# Patient Record
Sex: FEMALE | Race: WHITE | NOT HISPANIC OR LATINO | Employment: FULL TIME | URBAN - METROPOLITAN AREA
[De-identification: names, ages, dates, MRNs, and addresses within clinical notes are randomized per-mention and may not be internally consistent; named-entity substitution may affect disease eponyms.]

---

## 2023-04-13 PROBLEM — M54.16 LUMBAR RADICULOPATHY, RIGHT: Status: ACTIVE | Noted: 2023-04-13

## 2023-04-13 PROBLEM — D22.9 SKIN MOLE: Status: ACTIVE | Noted: 2023-04-13

## 2023-04-13 PROBLEM — D68.61 ANTIPHOSPHOLIPID SYNDROME (MULTI): Status: ACTIVE | Noted: 2023-04-13

## 2023-04-13 PROBLEM — R10.2 FEMALE PELVIC PAIN: Status: ACTIVE | Noted: 2023-04-13

## 2023-04-13 PROBLEM — B00.1 HERPES LABIALIS: Status: ACTIVE | Noted: 2023-04-13

## 2023-04-13 PROBLEM — M99.09 SEGMENTAL AND SOMATIC DYSFUNCTION: Status: ACTIVE | Noted: 2023-04-13

## 2023-04-13 PROBLEM — R19.8 CHANGE IN BOWEL FUNCTION: Status: ACTIVE | Noted: 2023-04-13

## 2023-04-13 PROBLEM — M99.03 LUMBOSACRAL DYSFUNCTION: Status: ACTIVE | Noted: 2023-04-13

## 2023-04-13 RX ORDER — ACETAMINOPHEN 325 MG/1
TABLET ORAL EVERY 6 HOURS
COMMUNITY
Start: 2019-07-12 | End: 2023-04-21 | Stop reason: ALTCHOICE

## 2023-04-13 RX ORDER — MULTIVITAMIN
TABLET ORAL
COMMUNITY
End: 2023-04-21 | Stop reason: ALTCHOICE

## 2023-04-13 RX ORDER — DOCUSATE SODIUM 50 MG/5ML
LIQUID ORAL
COMMUNITY
End: 2023-04-21 | Stop reason: ALTCHOICE

## 2023-04-13 RX ORDER — OXYCODONE HYDROCHLORIDE 10 MG/1
TABLET ORAL EVERY 6 HOURS PRN
COMMUNITY
Start: 2019-07-12 | End: 2023-04-21 | Stop reason: ALTCHOICE

## 2023-04-13 RX ORDER — FERROUS SULFATE 325(65) MG
TABLET ORAL
COMMUNITY
Start: 2019-07-12 | End: 2023-04-21 | Stop reason: ALTCHOICE

## 2023-04-13 RX ORDER — VALACYCLOVIR HYDROCHLORIDE 1 G/1
TABLET, FILM COATED ORAL
COMMUNITY
Start: 2018-07-25

## 2023-04-13 RX ORDER — IBUPROFEN 600 MG/1
TABLET ORAL AS NEEDED
COMMUNITY
Start: 2019-07-12

## 2023-04-21 ENCOUNTER — OFFICE VISIT (OUTPATIENT)
Dept: PRIMARY CARE | Facility: CLINIC | Age: 48
End: 2023-04-21
Payer: COMMERCIAL

## 2023-04-21 VITALS
SYSTOLIC BLOOD PRESSURE: 132 MMHG | HEART RATE: 66 BPM | TEMPERATURE: 96.8 F | BODY MASS INDEX: 22.16 KG/M2 | HEIGHT: 65 IN | WEIGHT: 133 LBS | RESPIRATION RATE: 20 BRPM | DIASTOLIC BLOOD PRESSURE: 86 MMHG

## 2023-04-21 DIAGNOSIS — Z13.29 SCREENING FOR THYROID DISORDER: ICD-10-CM

## 2023-04-21 DIAGNOSIS — Z13.220 SCREENING FOR LIPID DISORDERS: ICD-10-CM

## 2023-04-21 DIAGNOSIS — M99.03 LUMBOSACRAL DYSFUNCTION: ICD-10-CM

## 2023-04-21 DIAGNOSIS — Z00.00 ROUTINE GENERAL MEDICAL EXAMINATION AT A HEALTH CARE FACILITY: Primary | ICD-10-CM

## 2023-04-21 DIAGNOSIS — B00.1 HERPES LABIALIS: ICD-10-CM

## 2023-04-21 DIAGNOSIS — E89.41 SYMPTOMATIC POSTSURGICAL MENOPAUSE: ICD-10-CM

## 2023-04-21 PROBLEM — M99.09 SEGMENTAL AND SOMATIC DYSFUNCTION: Status: RESOLVED | Noted: 2023-04-13 | Resolved: 2023-04-21

## 2023-04-21 PROBLEM — R19.8 CHANGE IN BOWEL FUNCTION: Status: RESOLVED | Noted: 2023-04-13 | Resolved: 2023-04-21

## 2023-04-21 PROBLEM — D68.61 ANTIPHOSPHOLIPID SYNDROME (MULTI): Status: RESOLVED | Noted: 2023-04-13 | Resolved: 2023-04-21

## 2023-04-21 PROBLEM — D22.9 SKIN MOLE: Status: RESOLVED | Noted: 2023-04-13 | Resolved: 2023-04-21

## 2023-04-21 LAB
APPEARANCE, URINE: CLEAR
BILIRUBIN, URINE: NEGATIVE
BLOOD, URINE: NEGATIVE
COLOR, URINE: COLORLESS
GLUCOSE, URINE: NEGATIVE MG/DL
KETONES, URINE: NEGATIVE MG/DL
LEUKOCYTE ESTERASE, URINE: NEGATIVE
NITRITE, URINE: NEGATIVE
PH, URINE: 7 (ref 5–8)
PROTEIN, URINE: NEGATIVE MG/DL
SPECIFIC GRAVITY, URINE: 1 (ref 1–1.03)
UROBILINOGEN, URINE: <2 MG/DL (ref 0–1.9)

## 2023-04-21 PROCEDURE — 1036F TOBACCO NON-USER: CPT | Performed by: INTERNAL MEDICINE

## 2023-04-21 PROCEDURE — 81003 URINALYSIS AUTO W/O SCOPE: CPT

## 2023-04-21 PROCEDURE — 99396 PREV VISIT EST AGE 40-64: CPT | Performed by: INTERNAL MEDICINE

## 2023-04-21 ASSESSMENT — ENCOUNTER SYMPTOMS
SINUS PAIN: 0
HEMATURIA: 0
ARTHRALGIAS: 0
WOUND: 0
RECTAL PAIN: 0
VOICE CHANGE: 0
NECK STIFFNESS: 0
DYSURIA: 0
EYE ITCHING: 0
FEVER: 0
ACTIVITY CHANGE: 0
VOMITING: 0
SPEECH DIFFICULTY: 0
NERVOUS/ANXIOUS: 0
POLYDIPSIA: 0
CONFUSION: 0
MYALGIAS: 0
NUMBNESS: 0
LIGHT-HEADEDNESS: 0
DIAPHORESIS: 0
FATIGUE: 0
STRIDOR: 0
COLOR CHANGE: 0
BRUISES/BLEEDS EASILY: 0
PALPITATIONS: 0
ANAL BLEEDING: 0
SORE THROAT: 0
CHEST TIGHTNESS: 0
BLOOD IN STOOL: 0
ADENOPATHY: 0
WEAKNESS: 0
ABDOMINAL DISTENTION: 0
JOINT SWELLING: 0
EYE PAIN: 0
EYE REDNESS: 0
APPETITE CHANGE: 0
SHORTNESS OF BREATH: 0
PHOTOPHOBIA: 0
APNEA: 0
BACK PAIN: 1
CONSTIPATION: 0
NECK PAIN: 0
FREQUENCY: 0
CHILLS: 0
DIARRHEA: 0
AGITATION: 0
WHEEZING: 0
HEADACHES: 0
EYE DISCHARGE: 0
SEIZURES: 0
FACIAL ASYMMETRY: 0
ABDOMINAL PAIN: 0
NAUSEA: 0
COUGH: 0
POLYPHAGIA: 0
DIZZINESS: 0
DECREASED CONCENTRATION: 0
TREMORS: 0
HALLUCINATIONS: 0
FLANK PAIN: 0
UNEXPECTED WEIGHT CHANGE: 0
DIFFICULTY URINATING: 0
HYPERACTIVE: 0
FACIAL SWELLING: 0
SINUS PRESSURE: 0
CHOKING: 0
SLEEP DISTURBANCE: 0
DYSPHORIC MOOD: 0
TROUBLE SWALLOWING: 0
RHINORRHEA: 0

## 2023-04-21 NOTE — PROGRESS NOTES
Subjective   Patient ID: Manjula Prabhakar is a 47 y.o. female who presents for Annual Exam.    HPI    Pt here for full physical.  She is exercising regularly and eating well.  Weight is stable.      She just saw GYN-Dr. Schmidt this past week.  She did have a pap of the vaginal vault.  She has had partial hysterectomy.  She had her mammogram in June of 2022 that was negative.  She has her order.  She has no breast issues.    She did cologuard in 2022 and it was negative.  She has occasional constipation and will use Colace PRN.  She has history of hemorrhoids.  She saw specialist and was dx with fissure.  She was given nitroglycerin cream to use to area.  She also was given Paleo fiber which has helped.      She was having some panic attacks in the fall of 2022.  She started seeing a counselor since then and that has really helped.  She feels better at this time.       She has been told in past she had antiphospholipid ab however labs from 13 yo only revealed cardiolipin levels in equivocal range and not fulminately positive.  No history of blood clots/PE. Did have miscarriage.      Review of Systems   Constitutional:  Negative for activity change, appetite change, chills, diaphoresis, fatigue, fever and unexpected weight change.   HENT:  Negative for congestion, dental problem, drooling, ear discharge, ear pain, facial swelling, hearing loss, mouth sores, nosebleeds, postnasal drip, rhinorrhea, sinus pressure, sinus pain, sneezing, sore throat, tinnitus, trouble swallowing and voice change.    Eyes:  Negative for photophobia, pain, discharge, redness, itching and visual disturbance (wears glasses-has appointment next week).   Respiratory:  Negative for apnea, cough, choking, chest tightness, shortness of breath, wheezing and stridor.    Cardiovascular:  Negative for chest pain, palpitations and leg swelling.   Gastrointestinal:  Negative for abdominal distention, abdominal pain, anal bleeding, blood in stool,  "constipation, diarrhea, nausea, rectal pain and vomiting.   Endocrine: Positive for heat intolerance (hot flashes). Negative for cold intolerance, polydipsia, polyphagia and polyuria.   Genitourinary:  Negative for decreased urine volume, difficulty urinating, dyspareunia, dysuria, enuresis, flank pain, frequency, genital sores, hematuria, menstrual problem, pelvic pain, urgency, vaginal bleeding, vaginal discharge and vaginal pain.   Musculoskeletal:  Positive for back pain. Negative for arthralgias, gait problem, joint swelling, myalgias, neck pain and neck stiffness.   Skin:  Negative for color change, pallor, rash and wound.   Allergic/Immunologic: Negative for environmental allergies and food allergies.   Neurological:  Negative for dizziness, tremors, seizures, syncope, facial asymmetry, speech difficulty, weakness, light-headedness, numbness and headaches.   Hematological:  Negative for adenopathy. Does not bruise/bleed easily.   Psychiatric/Behavioral:  Negative for agitation, behavioral problems, confusion, decreased concentration, dysphoric mood, hallucinations, self-injury, sleep disturbance and suicidal ideas. The patient is not nervous/anxious and is not hyperactive.        Objective   /86   Pulse 66   Temp 36 °C (96.8 °F)   Resp 20   Ht 1.638 m (5' 4.5\")   Wt 60.3 kg (133 lb)   BMI 22.48 kg/m²    Physical Exam  Constitutional:       Appearance: Normal appearance.   HENT:      Head: Normocephalic and atraumatic.      Right Ear: Tympanic membrane, ear canal and external ear normal. There is no impacted cerumen.      Left Ear: Tympanic membrane, ear canal and external ear normal. There is no impacted cerumen.      Nose: Nose normal. No congestion or rhinorrhea.      Mouth/Throat:      Mouth: Mucous membranes are moist.      Pharynx: Oropharynx is clear. No oropharyngeal exudate or posterior oropharyngeal erythema.   Eyes:      Extraocular Movements: Extraocular movements intact.      " Conjunctiva/sclera: Conjunctivae normal.      Pupils: Pupils are equal, round, and reactive to light.   Neck:      Vascular: No carotid bruit.   Cardiovascular:      Rate and Rhythm: Normal rate and regular rhythm.      Pulses: Normal pulses.      Heart sounds: Normal heart sounds. No murmur heard.  Pulmonary:      Effort: Pulmonary effort is normal. No respiratory distress.      Breath sounds: Normal breath sounds. No wheezing, rhonchi or rales.   Abdominal:      General: Abdomen is flat. Bowel sounds are normal. There is no distension.      Palpations: Abdomen is soft.      Tenderness: There is no abdominal tenderness.      Hernia: No hernia is present.   Musculoskeletal:         General: No swelling or tenderness. Normal range of motion.      Cervical back: Normal range of motion and neck supple.      Right lower leg: No edema.      Left lower leg: No edema.   Lymphadenopathy:      Cervical: No cervical adenopathy.   Skin:     General: Skin is warm and dry.      Findings: No lesion or rash.   Neurological:      General: No focal deficit present.      Mental Status: She is alert and oriented to person, place, and time.      Cranial Nerves: No cranial nerve deficit.      Sensory: No sensory deficit.      Motor: No weakness.   Psychiatric:         Mood and Affect: Mood normal.         Behavior: Behavior normal.         Thought Content: Thought content normal.         Judgment: Judgment normal.         Assessment/Plan   Problem List Items Addressed This Visit          Musculoskeletal    Lumbosacral dysfunction     Seeing chiropractor for low back issues  Currently improved without radiation at this time             Infectious/Inflammatory    Herpes labialis     No current issues  Uses Valtrex at sign of outbreak             Other    Symptomatic postsurgical menopause     She would like to try to manage these without hormone therapy  Did provide her with name of [employer] company that does plant based treatment for  symptoms           Other Visit Diagnoses       Routine general medical examination at a health care facility    -  Primary    Relevant Orders    CBC    Comprehensive Metabolic Panel    Urinalysis with Reflex Microscopic    Follow Up In Primary Care    Screening for lipid disorders        Relevant Orders    Lipid Panel    Screening for thyroid disorder        Relevant Orders    Thyroid Stimulating Hormone           No

## 2023-04-21 NOTE — PATIENT INSTRUCTIONS
Look up [employer] for herbal/plant based remedies for hot flashes/menopause   Get fasting blood work when able (no food after midnight)-can have water/black coffee in AM  Continue to work on healthy diet and exercise  See GYN yearly and have mammogram done when due (after 6/6/2023)-order is in   Consider flu and covid boosters in fall  Follow up in 1 year-sooner if needed

## 2023-04-21 NOTE — ASSESSMENT & PLAN NOTE
She would like to try to manage these without hormone therapy  Did provide her with name of [employer] company that does plant based treatment for symptoms

## 2023-04-21 NOTE — ASSESSMENT & PLAN NOTE
Currently not an issue; unclear on diagnosis as this was tested when pregnant and had miscarriage

## 2023-05-05 ENCOUNTER — LAB (OUTPATIENT)
Dept: LAB | Facility: LAB | Age: 48
End: 2023-05-05
Payer: COMMERCIAL

## 2023-05-05 DIAGNOSIS — Z00.00 ROUTINE GENERAL MEDICAL EXAMINATION AT A HEALTH CARE FACILITY: ICD-10-CM

## 2023-05-05 DIAGNOSIS — Z13.29 SCREENING FOR THYROID DISORDER: ICD-10-CM

## 2023-05-05 DIAGNOSIS — Z13.220 SCREENING FOR LIPID DISORDERS: ICD-10-CM

## 2023-05-05 LAB
ALANINE AMINOTRANSFERASE (SGPT) (U/L) IN SER/PLAS: 16 U/L (ref 7–45)
ALBUMIN (G/DL) IN SER/PLAS: 4.4 G/DL (ref 3.4–5)
ALKALINE PHOSPHATASE (U/L) IN SER/PLAS: 61 U/L (ref 33–110)
ANION GAP IN SER/PLAS: 11 MMOL/L (ref 10–20)
ASPARTATE AMINOTRANSFERASE (SGOT) (U/L) IN SER/PLAS: 16 U/L (ref 9–39)
BILIRUBIN TOTAL (MG/DL) IN SER/PLAS: 1.1 MG/DL (ref 0–1.2)
CALCIUM (MG/DL) IN SER/PLAS: 9.9 MG/DL (ref 8.6–10.6)
CARBON DIOXIDE, TOTAL (MMOL/L) IN SER/PLAS: 30 MMOL/L (ref 21–32)
CHLORIDE (MMOL/L) IN SER/PLAS: 104 MMOL/L (ref 98–107)
CHOLESTEROL (MG/DL) IN SER/PLAS: 161 MG/DL (ref 0–199)
CHOLESTEROL IN HDL (MG/DL) IN SER/PLAS: 72.7 MG/DL
CHOLESTEROL/HDL RATIO: 2.2
CREATININE (MG/DL) IN SER/PLAS: 0.79 MG/DL (ref 0.5–1.05)
ERYTHROCYTE DISTRIBUTION WIDTH (RATIO) BY AUTOMATED COUNT: 12.3 % (ref 11.5–14.5)
ERYTHROCYTE MEAN CORPUSCULAR HEMOGLOBIN CONCENTRATION (G/DL) BY AUTOMATED: 34.5 G/DL (ref 32–36)
ERYTHROCYTE MEAN CORPUSCULAR VOLUME (FL) BY AUTOMATED COUNT: 87 FL (ref 80–100)
ERYTHROCYTES (10*6/UL) IN BLOOD BY AUTOMATED COUNT: 4.38 X10E12/L (ref 4–5.2)
GFR FEMALE: >90 ML/MIN/1.73M2
GLUCOSE (MG/DL) IN SER/PLAS: 82 MG/DL (ref 74–99)
HEMATOCRIT (%) IN BLOOD BY AUTOMATED COUNT: 38 % (ref 36–46)
HEMOGLOBIN (G/DL) IN BLOOD: 13.1 G/DL (ref 12–16)
LDL: 80 MG/DL (ref 0–99)
LEUKOCYTES (10*3/UL) IN BLOOD BY AUTOMATED COUNT: 4 X10E9/L (ref 4.4–11.3)
NRBC (PER 100 WBCS) BY AUTOMATED COUNT: 0 /100 WBC (ref 0–0)
PLATELETS (10*3/UL) IN BLOOD AUTOMATED COUNT: 249 X10E9/L (ref 150–450)
POTASSIUM (MMOL/L) IN SER/PLAS: 4.1 MMOL/L (ref 3.5–5.3)
PROTEIN TOTAL: 7.1 G/DL (ref 6.4–8.2)
SODIUM (MMOL/L) IN SER/PLAS: 141 MMOL/L (ref 136–145)
THYROTROPIN (MIU/L) IN SER/PLAS BY DETECTION LIMIT <= 0.05 MIU/L: 1.81 MIU/L (ref 0.44–3.98)
TRIGLYCERIDE (MG/DL) IN SER/PLAS: 40 MG/DL (ref 0–149)
UREA NITROGEN (MG/DL) IN SER/PLAS: 15 MG/DL (ref 6–23)
VLDL: 8 MG/DL (ref 0–40)

## 2023-05-05 PROCEDURE — 80061 LIPID PANEL: CPT

## 2023-05-05 PROCEDURE — 85027 COMPLETE CBC AUTOMATED: CPT

## 2023-05-05 PROCEDURE — 36415 COLL VENOUS BLD VENIPUNCTURE: CPT

## 2023-05-05 PROCEDURE — 80053 COMPREHEN METABOLIC PANEL: CPT

## 2023-05-05 PROCEDURE — 84443 ASSAY THYROID STIM HORMONE: CPT

## 2024-04-24 ENCOUNTER — OFFICE VISIT (OUTPATIENT)
Dept: OBSTETRICS AND GYNECOLOGY | Facility: CLINIC | Age: 49
End: 2024-04-24
Payer: COMMERCIAL

## 2024-04-24 VITALS
HEIGHT: 64 IN | SYSTOLIC BLOOD PRESSURE: 122 MMHG | WEIGHT: 135 LBS | BODY MASS INDEX: 23.05 KG/M2 | DIASTOLIC BLOOD PRESSURE: 80 MMHG

## 2024-04-24 DIAGNOSIS — Z01.419 ENCOUNTER FOR GYNECOLOGICAL EXAMINATION WITHOUT ABNORMAL FINDING: Primary | ICD-10-CM

## 2024-04-24 PROCEDURE — 99396 PREV VISIT EST AGE 40-64: CPT | Performed by: OBSTETRICS & GYNECOLOGY

## 2024-04-24 PROCEDURE — 88141 CYTOPATH C/V INTERPRET: CPT | Performed by: PATHOLOGY

## 2024-04-24 PROCEDURE — 87624 HPV HI-RISK TYP POOLED RSLT: CPT

## 2024-04-24 PROCEDURE — 1036F TOBACCO NON-USER: CPT | Performed by: OBSTETRICS & GYNECOLOGY

## 2024-04-24 PROCEDURE — 88175 CYTOPATH C/V AUTO FLUID REDO: CPT

## 2024-04-24 NOTE — PROGRESS NOTES
Subjective   Manjula Prabhakar is a 48 y.o. female here for a routine exam.  She has a history of a hysterectomy, no BSO.  A vaginal Pap last year showed low-grade dysplasia, high risk HPV negative.  No previous history of normal Pap smears in the past.    She has no postmenopausal bleeding or pelvic pain.  She has an occasional yellow-tinged discharge without itching or odor.  No dysuria or change in bowel habits.  She has not been sexually active for a year.    Personal health questionnaire reviewed: yes.     Gynecologic History  No LMP recorded (lmp unknown). Patient has had a hysterectomy.  Contraception: status post hysterectomy  Last Pap: 23. Results were: abnormal(LGSIL)  Last mammogram: 22. Results were: normal    Obstetric History  OB History    Para Term  AB Living   6 4     2     SAB IAB Ectopic Multiple Live Births   2              # Outcome Date GA Lbr Jg/2nd Weight Sex Delivery Anes PTL Lv   6 SAB            5 SAB            4 Para            3 Para            2 Para            1 Para                Objective   Constitutional: Alert and in no acute distress. Well developed, well nourished.   Head and Face: Head and face: Normal.    Eyes: Normal external exam - nonicteric sclera, extraocular movements intact (EOMI) and no ptosis.   Neck: No neck asymmetry. Supple. Thyroid not enlarged and there were no palpable thyroid nodules.    Pulmonary: No respiratory distress.   Chest: Breasts: Normal appearance, no nipple discharge and no skin changes. Palpation of breasts and axillae: No palpable mass and no axillary lymphadenopathy.   Abdomen: Soft nontender; no abdominal mass palpated. No organomegaly. No hernias.   Genitourinary: External genitalia: Normal. No inguinal lymphadenopathy. Bartholin's Urethral and Skenes Glands: Normal. Urethra: Normal.  Bladder: Normal on palpation. Vagina: Normal. Cervix: Absent.  Uterus: Absent.  Right Adnexa/parametria: Normal.  Left Adnexa/parametria:  Normal.  Inspection of Perianal Area: Normal.   Musculoskeletal: No joint swelling seen, normal movements of all extremities.   Skin: Normal skin color and pigmentation, normal skin turgor, and no rash.   Neurologic: Non-focal. Grossly intact.   Psychiatric: Alert and oriented x 3. Affect normal to patient baseline. Mood: Appropriate.  Physical Exam     Assessment/Plan   Healthy female exam.  This is a 48-year-old female with a normal exam.  She has a history of a hysterectomy.  Her last vaginal Pap show low-grade dysplasia, a Pap was sent.  Her routine mammogram was ordered with tomosynthesis.    She is current on her Cologuard from April 2022.    We discussed management of vaginal atrophy, she was given a sample and information on Revaree, a hyaluronic acid vaginal suppository [facility].   We also discussed estradiol cream if symptoms are not improving.    I will see routinely in 1 year.  Mammogram ordered.

## 2024-05-03 ENCOUNTER — OFFICE VISIT (OUTPATIENT)
Dept: PRIMARY CARE | Facility: CLINIC | Age: 49
End: 2024-05-03
Payer: COMMERCIAL

## 2024-05-03 VITALS
SYSTOLIC BLOOD PRESSURE: 128 MMHG | HEIGHT: 64 IN | BODY MASS INDEX: 23 KG/M2 | RESPIRATION RATE: 16 BRPM | DIASTOLIC BLOOD PRESSURE: 78 MMHG | WEIGHT: 134.7 LBS | HEART RATE: 76 BPM

## 2024-05-03 DIAGNOSIS — Z11.59 ENCOUNTER FOR HEPATITIS C SCREENING TEST FOR LOW RISK PATIENT: ICD-10-CM

## 2024-05-03 DIAGNOSIS — Z13.29 SCREENING FOR THYROID DISORDER: ICD-10-CM

## 2024-05-03 DIAGNOSIS — Z00.00 ROUTINE GENERAL MEDICAL EXAMINATION AT A HEALTH CARE FACILITY: ICD-10-CM

## 2024-05-03 DIAGNOSIS — Z13.220 SCREENING FOR LIPID DISORDERS: Primary | ICD-10-CM

## 2024-05-03 DIAGNOSIS — Z11.4 SCREENING FOR HIV WITHOUT PRESENCE OF RISK FACTORS: ICD-10-CM

## 2024-05-03 LAB
CYTOLOGY CMNT CVX/VAG CYTO-IMP: NORMAL
HPV HR 12 DNA GENITAL QL NAA+PROBE: NEGATIVE
HPV HR GENOTYPES PNL CVX NAA+PROBE: NEGATIVE
HPV16 DNA SPEC QL NAA+PROBE: NEGATIVE
HPV18 DNA SPEC QL NAA+PROBE: NEGATIVE
LAB AP HPV GENOTYPE QUESTION: YES
LAB AP HPV HR: NORMAL
LAB AP PREVIOUS ABNORMAL HISTORY: NORMAL
LABORATORY COMMENT REPORT: NORMAL
MENSTRUAL HX REPORTED: NORMAL
PATH REPORT.TOTAL CANCER: NORMAL

## 2024-05-03 PROCEDURE — 36415 COLL VENOUS BLD VENIPUNCTURE: CPT

## 2024-05-03 PROCEDURE — 84443 ASSAY THYROID STIM HORMONE: CPT

## 2024-05-03 PROCEDURE — 1036F TOBACCO NON-USER: CPT | Performed by: INTERNAL MEDICINE

## 2024-05-03 PROCEDURE — 86803 HEPATITIS C AB TEST: CPT

## 2024-05-03 PROCEDURE — 85027 COMPLETE CBC AUTOMATED: CPT

## 2024-05-03 PROCEDURE — 81001 URINALYSIS AUTO W/SCOPE: CPT

## 2024-05-03 PROCEDURE — 99396 PREV VISIT EST AGE 40-64: CPT | Performed by: INTERNAL MEDICINE

## 2024-05-03 PROCEDURE — 80053 COMPREHEN METABOLIC PANEL: CPT

## 2024-05-03 PROCEDURE — 87389 HIV-1 AG W/HIV-1&-2 AB AG IA: CPT

## 2024-05-03 PROCEDURE — 80061 LIPID PANEL: CPT

## 2024-05-03 ASSESSMENT — ENCOUNTER SYMPTOMS
WOUND: 0
ANAL BLEEDING: 0
CHILLS: 0
EYE ITCHING: 0
SLEEP DISTURBANCE: 0
DECREASED CONCENTRATION: 0
NERVOUS/ANXIOUS: 0
CHOKING: 0
UNEXPECTED WEIGHT CHANGE: 0
APPETITE CHANGE: 0
CONSTIPATION: 0
EYE REDNESS: 0
VOMITING: 0
NUMBNESS: 0
MYALGIAS: 0
EYE DISCHARGE: 0
RECTAL PAIN: 0
ADENOPATHY: 0
DIARRHEA: 0
ABDOMINAL DISTENTION: 0
DIAPHORESIS: 0
ACTIVITY CHANGE: 0
FACIAL ASYMMETRY: 0
VOICE CHANGE: 0
WHEEZING: 0
BACK PAIN: 0
CONFUSION: 0
HALLUCINATIONS: 0
HEMATURIA: 0
POLYPHAGIA: 0
LIGHT-HEADEDNESS: 0
SINUS PRESSURE: 0
ABDOMINAL PAIN: 0
FEVER: 0
COLOR CHANGE: 0
BRUISES/BLEEDS EASILY: 0
NAUSEA: 0
SHORTNESS OF BREATH: 0
PHOTOPHOBIA: 0
BLOOD IN STOOL: 0
HEADACHES: 0
APNEA: 0
COUGH: 0
FATIGUE: 0
RHINORRHEA: 0
TROUBLE SWALLOWING: 0
NECK STIFFNESS: 0
SORE THROAT: 0
WEAKNESS: 0
FACIAL SWELLING: 0
PALPITATIONS: 0
HYPERACTIVE: 0
JOINT SWELLING: 0
FLANK PAIN: 0
EYE PAIN: 0
NECK PAIN: 0
AGITATION: 0
SEIZURES: 0
DIFFICULTY URINATING: 0
DYSPHORIC MOOD: 0
DIZZINESS: 0
TREMORS: 0
ARTHRALGIAS: 0
FREQUENCY: 0
POLYDIPSIA: 0
SPEECH DIFFICULTY: 0
DYSURIA: 0
SINUS PAIN: 0
STRIDOR: 0
CHEST TIGHTNESS: 0

## 2024-05-03 ASSESSMENT — PROMIS GLOBAL HEALTH SCALE
RATE_SOCIAL_SATISFACTION: VERY GOOD
RATE_QUALITY_OF_LIFE: VERY GOOD
RATE_MENTAL_HEALTH: GOOD
CARRYOUT_SOCIAL_ACTIVITIES: VERY GOOD
RATE_AVERAGE_FATIGUE: MILD
RATE_PHYSICAL_HEALTH: VERY GOOD
CARRYOUT_PHYSICAL_ACTIVITIES: COMPLETELY
RATE_AVERAGE_PAIN: 2
RATE_GENERAL_HEALTH: VERY GOOD
EMOTIONAL_PROBLEMS: SOMETIMES

## 2024-05-03 ASSESSMENT — PATIENT HEALTH QUESTIONNAIRE - PHQ9
SUM OF ALL RESPONSES TO PHQ9 QUESTIONS 1 AND 2: 0
2. FEELING DOWN, DEPRESSED OR HOPELESS: NOT AT ALL
1. LITTLE INTEREST OR PLEASURE IN DOING THINGS: NOT AT ALL

## 2024-05-03 NOTE — PATIENT INSTRUCTIONS
We did blood work today and will call if anything is abnormal  Continue to see GYN yearly  Call and schedule mammogram as soon as able  Continue healthy diet and exercise regularly for weight control and general health  Consider and do recommend 2 part shingles vaccine  Follow up here in 1 year-sooner if needed

## 2024-05-03 NOTE — PROGRESS NOTES
Subjective   Patient ID: Manjula Prabhakar is a 48 y.o. female who presents for Annual Exam.    HPI  Pt here for full physical.  She really watches her diet and exercises regularly-3 times a week will do a stepper or get outside to walk.      She sees GYN (Dr. Schmidt) and had a pap in 4/2023 that showed squamous cell abnormality.  HPV was negative.  She had visit for repeat pap last week.  She is waiting on results.  She had vaginal dryness and was given samples of Revee to try.    She has not had mammogram in few years (2022).  No breast issues.  She has order from GYN to have done.      She has had to see GI for a fissure.  She is moving her bowels ok now.  She uses Colace as needed.  She is also on magnesium a bit.   She did cologuard in 2022 and that was negative.  No colorectal cancer in family.          Review of Systems   Constitutional:  Negative for activity change, appetite change, chills, diaphoresis, fatigue, fever and unexpected weight change.   HENT:  Negative for congestion, dental problem, drooling, ear discharge, ear pain, facial swelling, hearing loss, mouth sores, nosebleeds, postnasal drip, rhinorrhea, sinus pressure, sinus pain, sneezing, sore throat, tinnitus, trouble swallowing and voice change.    Eyes:  Negative for photophobia, pain, discharge, redness, itching and visual disturbance.   Respiratory:  Negative for apnea, cough, choking, chest tightness, shortness of breath, wheezing and stridor.    Cardiovascular:  Negative for chest pain, palpitations and leg swelling.   Gastrointestinal:  Negative for abdominal distention, abdominal pain, anal bleeding, blood in stool, constipation, diarrhea, nausea, rectal pain and vomiting.   Endocrine: Negative for cold intolerance, heat intolerance, polydipsia, polyphagia and polyuria.   Genitourinary:  Negative for decreased urine volume, difficulty urinating, dyspareunia, dysuria, enuresis, flank pain, frequency, genital sores, hematuria, menstrual  "problem, pelvic pain, urgency, vaginal bleeding, vaginal discharge and vaginal pain.   Musculoskeletal:  Negative for arthralgias, back pain, gait problem, joint swelling, myalgias, neck pain and neck stiffness.   Skin:  Negative for color change, pallor, rash and wound.   Allergic/Immunologic: Negative for environmental allergies, food allergies and immunocompromised state.   Neurological:  Negative for dizziness, tremors, seizures, syncope, facial asymmetry, speech difficulty, weakness, light-headedness, numbness and headaches.   Hematological:  Negative for adenopathy. Does not bruise/bleed easily.   Psychiatric/Behavioral:  Negative for agitation, behavioral problems, confusion, decreased concentration, dysphoric mood, hallucinations, self-injury, sleep disturbance and suicidal ideas. The patient is not nervous/anxious and is not hyperactive.        Objective   /78 (BP Location: Left arm, Patient Position: Sitting)   Pulse 76   Resp 16   Ht 1.619 m (5' 3.75\")   Wt 61.1 kg (134 lb 11.2 oz)   LMP  (LMP Unknown) Comment: hyst 2019  BMI 23.30 kg/m²    Physical Exam  Constitutional:       Appearance: Normal appearance.   HENT:      Head: Normocephalic and atraumatic.      Right Ear: Tympanic membrane, ear canal and external ear normal. There is no impacted cerumen.      Left Ear: Tympanic membrane, ear canal and external ear normal. There is no impacted cerumen.      Nose: Nose normal. No congestion or rhinorrhea.      Mouth/Throat:      Mouth: Mucous membranes are moist.      Pharynx: Oropharynx is clear. No oropharyngeal exudate or posterior oropharyngeal erythema.   Eyes:      Extraocular Movements: Extraocular movements intact.      Conjunctiva/sclera: Conjunctivae normal.      Pupils: Pupils are equal, round, and reactive to light.   Neck:      Vascular: No carotid bruit.   Cardiovascular:      Rate and Rhythm: Normal rate and regular rhythm.      Pulses: Normal pulses.      Heart sounds: Normal " heart sounds. No murmur heard.  Pulmonary:      Effort: Pulmonary effort is normal. No respiratory distress.      Breath sounds: Normal breath sounds. No wheezing, rhonchi or rales.   Abdominal:      General: Abdomen is flat. Bowel sounds are normal. There is no distension.      Palpations: Abdomen is soft.      Tenderness: There is no abdominal tenderness.      Hernia: No hernia is present.   Musculoskeletal:         General: No swelling or tenderness. Normal range of motion.      Cervical back: Normal range of motion and neck supple.      Right lower leg: No edema.      Left lower leg: No edema.   Lymphadenopathy:      Cervical: No cervical adenopathy.   Skin:     General: Skin is warm and dry.      Findings: No lesion or rash.   Neurological:      General: No focal deficit present.      Mental Status: She is alert and oriented to person, place, and time.      Cranial Nerves: No cranial nerve deficit.      Sensory: No sensory deficit.      Motor: No weakness.   Psychiatric:         Mood and Affect: Mood normal.         Behavior: Behavior normal.         Thought Content: Thought content normal.         Judgment: Judgment normal.         Assessment/Plan   Problem List Items Addressed This Visit    None  Visit Diagnoses       Screening for lipid disorders    -  Primary    Relevant Orders    Lipid Panel    Routine general medical examination at a health care facility        Relevant Orders    Comprehensive Metabolic Panel    CBC    Urinalysis with Reflex Microscopic    Follow Up In Primary Care - Health Maintenance    Screening for thyroid disorder        Relevant Orders    TSH with reflex to Free T4 if abnormal    Encounter for hepatitis C screening test for low risk patient        Relevant Orders    Hepatitis C antibody    Screening for HIV without presence of risk factors        Relevant Orders    HIV 1/2 Antigen/Antibody Screen with Reflex to Confirmation

## 2024-05-04 LAB
ALBUMIN SERPL BCP-MCNC: 4.5 G/DL (ref 3.4–5)
ALP SERPL-CCNC: 68 U/L (ref 33–110)
ALT SERPL W P-5'-P-CCNC: 17 U/L (ref 7–45)
ANION GAP SERPL CALC-SCNC: 12 MMOL/L (ref 10–20)
APPEARANCE UR: CLEAR
AST SERPL W P-5'-P-CCNC: 17 U/L (ref 9–39)
BILIRUB SERPL-MCNC: 0.7 MG/DL (ref 0–1.2)
BILIRUB UR STRIP.AUTO-MCNC: NEGATIVE MG/DL
BUN SERPL-MCNC: 18 MG/DL (ref 6–23)
CALCIUM SERPL-MCNC: 9.7 MG/DL (ref 8.6–10.6)
CHLORIDE SERPL-SCNC: 103 MMOL/L (ref 98–107)
CHOLEST SERPL-MCNC: 164 MG/DL (ref 0–199)
CHOLESTEROL/HDL RATIO: 2.2
CO2 SERPL-SCNC: 29 MMOL/L (ref 21–32)
COLOR UR: COLORLESS
CREAT SERPL-MCNC: 0.84 MG/DL (ref 0.5–1.05)
EGFRCR SERPLBLD CKD-EPI 2021: 85 ML/MIN/1.73M*2
ERYTHROCYTE [DISTWIDTH] IN BLOOD BY AUTOMATED COUNT: 12.4 % (ref 11.5–14.5)
GLUCOSE SERPL-MCNC: 83 MG/DL (ref 74–99)
GLUCOSE UR STRIP.AUTO-MCNC: NORMAL MG/DL
HCT VFR BLD AUTO: 38.8 % (ref 36–46)
HCV AB SER QL: NONREACTIVE
HDLC SERPL-MCNC: 74.1 MG/DL
HGB BLD-MCNC: 13.6 G/DL (ref 12–16)
HIV 1+2 AB+HIV1 P24 AG SERPL QL IA: NONREACTIVE
KETONES UR STRIP.AUTO-MCNC: NEGATIVE MG/DL
LDLC SERPL CALC-MCNC: 82 MG/DL
LEUKOCYTE ESTERASE UR QL STRIP.AUTO: ABNORMAL
MCH RBC QN AUTO: 30.2 PG (ref 26–34)
MCHC RBC AUTO-ENTMCNC: 35.1 G/DL (ref 32–36)
MCV RBC AUTO: 86 FL (ref 80–100)
NITRITE UR QL STRIP.AUTO: NEGATIVE
NON HDL CHOLESTEROL: 90 MG/DL (ref 0–149)
NRBC BLD-RTO: 0 /100 WBCS (ref 0–0)
PH UR STRIP.AUTO: 6 [PH]
PLATELET # BLD AUTO: 254 X10*3/UL (ref 150–450)
POTASSIUM SERPL-SCNC: 3.9 MMOL/L (ref 3.5–5.3)
PROT SERPL-MCNC: 7.3 G/DL (ref 6.4–8.2)
PROT UR STRIP.AUTO-MCNC: NEGATIVE MG/DL
RBC # BLD AUTO: 4.5 X10*6/UL (ref 4–5.2)
RBC # UR STRIP.AUTO: NEGATIVE /UL
RBC #/AREA URNS AUTO: NORMAL /HPF
SODIUM SERPL-SCNC: 140 MMOL/L (ref 136–145)
SP GR UR STRIP.AUTO: 1
TRIGL SERPL-MCNC: 40 MG/DL (ref 0–149)
TSH SERPL-ACNC: 1.49 MIU/L (ref 0.44–3.98)
UROBILINOGEN UR STRIP.AUTO-MCNC: NORMAL MG/DL
VLDL: 8 MG/DL (ref 0–40)
WBC # BLD AUTO: 3.2 X10*3/UL (ref 4.4–11.3)
WBC #/AREA URNS AUTO: NORMAL /HPF

## 2024-08-28 DIAGNOSIS — N95.8 GENITOURINARY SYNDROME OF MENOPAUSE: Primary | ICD-10-CM

## 2024-08-28 RX ORDER — ESTRADIOL 0.1 MG/G
0.5 CREAM VAGINAL 3 TIMES WEEKLY
Qty: 42.5 G | Refills: 3 | Status: SHIPPED | OUTPATIENT
Start: 2024-08-28 | End: 2025-08-28

## 2025-02-17 ENCOUNTER — HOSPITAL ENCOUNTER (OUTPATIENT)
Dept: RADIOLOGY | Facility: CLINIC | Age: 50
Discharge: HOME | End: 2025-02-17
Payer: COMMERCIAL

## 2025-02-17 DIAGNOSIS — Z01.419 ENCOUNTER FOR GYNECOLOGICAL EXAMINATION WITHOUT ABNORMAL FINDING: ICD-10-CM

## 2025-02-17 PROCEDURE — 77067 SCR MAMMO BI INCL CAD: CPT | Performed by: RADIOLOGY

## 2025-02-17 PROCEDURE — 77063 BREAST TOMOSYNTHESIS BI: CPT | Performed by: RADIOLOGY

## 2025-02-17 PROCEDURE — 77067 SCR MAMMO BI INCL CAD: CPT

## 2025-05-06 ENCOUNTER — APPOINTMENT (OUTPATIENT)
Dept: PRIMARY CARE | Facility: CLINIC | Age: 50
End: 2025-05-06
Payer: COMMERCIAL

## 2025-05-06 VITALS
OXYGEN SATURATION: 95 % | WEIGHT: 135.9 LBS | BODY MASS INDEX: 24.08 KG/M2 | RESPIRATION RATE: 16 BRPM | DIASTOLIC BLOOD PRESSURE: 65 MMHG | TEMPERATURE: 97.3 F | HEIGHT: 63 IN | SYSTOLIC BLOOD PRESSURE: 98 MMHG | HEART RATE: 95 BPM

## 2025-05-06 DIAGNOSIS — B00.1 HERPES LABIALIS: ICD-10-CM

## 2025-05-06 DIAGNOSIS — Z13.29 SCREENING FOR THYROID DISORDER: ICD-10-CM

## 2025-05-06 DIAGNOSIS — Z13.220 SCREENING FOR LIPID DISORDERS: ICD-10-CM

## 2025-05-06 DIAGNOSIS — D72.819 LEUKOPENIA, UNSPECIFIED TYPE: ICD-10-CM

## 2025-05-06 DIAGNOSIS — Z12.11 SCREENING FOR COLORECTAL CANCER: ICD-10-CM

## 2025-05-06 DIAGNOSIS — M99.03 LUMBOSACRAL DYSFUNCTION: ICD-10-CM

## 2025-05-06 DIAGNOSIS — Z00.00 ROUTINE GENERAL MEDICAL EXAMINATION AT A HEALTH CARE FACILITY: Primary | ICD-10-CM

## 2025-05-06 DIAGNOSIS — Z12.12 SCREENING FOR COLORECTAL CANCER: ICD-10-CM

## 2025-05-06 PROCEDURE — 3008F BODY MASS INDEX DOCD: CPT | Performed by: INTERNAL MEDICINE

## 2025-05-06 PROCEDURE — 1036F TOBACCO NON-USER: CPT | Performed by: INTERNAL MEDICINE

## 2025-05-06 PROCEDURE — 99396 PREV VISIT EST AGE 40-64: CPT | Performed by: INTERNAL MEDICINE

## 2025-05-06 RX ORDER — BISACODYL 5 MG
TABLET, DELAYED RELEASE (ENTERIC COATED) ORAL
COMMUNITY

## 2025-05-06 RX ORDER — VALACYCLOVIR HYDROCHLORIDE 1 G/1
TABLET, FILM COATED ORAL
Qty: 30 TABLET | Refills: 0 | Status: SHIPPED | OUTPATIENT
Start: 2025-05-06

## 2025-05-06 ASSESSMENT — ENCOUNTER SYMPTOMS
CONSTIPATION: 0
DECREASED CONCENTRATION: 0
APPETITE CHANGE: 0
COLOR CHANGE: 0
EYE ITCHING: 0
ADENOPATHY: 0
SINUS PRESSURE: 0
UNEXPECTED WEIGHT CHANGE: 0
PALPITATIONS: 0
FACIAL SWELLING: 0
DYSURIA: 0
DIFFICULTY URINATING: 0
EYE REDNESS: 0
FREQUENCY: 0
EYE PAIN: 0
LIGHT-HEADEDNESS: 0
DIAPHORESIS: 0
ARTHRALGIAS: 0
TROUBLE SWALLOWING: 0
FACIAL ASYMMETRY: 0
DYSPHORIC MOOD: 0
WOUND: 0
NECK STIFFNESS: 0
TREMORS: 0
SORE THROAT: 0
CHOKING: 0
FATIGUE: 0
HEADACHES: 0
DIARRHEA: 0
POLYPHAGIA: 0
NAUSEA: 0
ABDOMINAL PAIN: 0
VOICE CHANGE: 0
ACTIVITY CHANGE: 0
HEMATURIA: 0
HYPERACTIVE: 0
SINUS PAIN: 0
SEIZURES: 0
NECK PAIN: 0
BLOOD IN STOOL: 0
SPEECH DIFFICULTY: 0
EYE DISCHARGE: 0
PHOTOPHOBIA: 0
SHORTNESS OF BREATH: 0
VOMITING: 0
FLANK PAIN: 0
WHEEZING: 0
DIZZINESS: 0
CHILLS: 0
COUGH: 0
MYALGIAS: 0
BACK PAIN: 1
JOINT SWELLING: 0
APNEA: 0
STRIDOR: 0
RHINORRHEA: 0
SLEEP DISTURBANCE: 0
NERVOUS/ANXIOUS: 0
BRUISES/BLEEDS EASILY: 0
RECTAL PAIN: 0
NUMBNESS: 0
ANAL BLEEDING: 0
ABDOMINAL DISTENTION: 0
CHEST TIGHTNESS: 0
POLYDIPSIA: 0
AGITATION: 0
FEVER: 0
CONFUSION: 0
HALLUCINATIONS: 0
WEAKNESS: 0

## 2025-05-06 ASSESSMENT — PATIENT HEALTH QUESTIONNAIRE - PHQ9
1. LITTLE INTEREST OR PLEASURE IN DOING THINGS: NOT AT ALL
SUM OF ALL RESPONSES TO PHQ9 QUESTIONS 1 AND 2: 0
2. FEELING DOWN, DEPRESSED OR HOPELESS: NOT AT ALL

## 2025-05-06 NOTE — PROGRESS NOTES
Subjective   Patient ID: Manjula Prabhakar is a 49 y.o. female who presents for Annual Exam.    HPI  Pt here for full physical.  Her weight is stable.  She is eating well/exercising regularly.  Sleep has improved.      She sees Dr. Schmidt for GYN care.  Has visit in few weeks.  No pelvic or urinary issues.  On vaginal estrogen for dryness.  She had normal pap in 2024.    She had normal mammogram in 2/2025.  No breast issues.      She did cologuard in 2022 and this was negative.  No bowel issues.  She is amendable to colonoscopy this time.      She still has some back pain-not as bad.  Using infared pad and doing yoga to manage.  Hasn't had to go to chiropractor of late.      Review of Systems   Constitutional:  Negative for activity change, appetite change, chills, diaphoresis, fatigue, fever and unexpected weight change.   HENT:  Negative for congestion, dental problem, drooling, ear discharge, ear pain, facial swelling, hearing loss, mouth sores, nosebleeds, postnasal drip, rhinorrhea, sinus pressure, sinus pain, sneezing, sore throat, tinnitus, trouble swallowing and voice change.    Eyes:  Negative for photophobia, pain, discharge, redness, itching and visual disturbance (wears glasses-up to date exam).   Respiratory:  Negative for apnea, cough, choking, chest tightness, shortness of breath, wheezing and stridor.    Cardiovascular:  Negative for chest pain, palpitations and leg swelling.   Gastrointestinal:  Negative for abdominal distention, abdominal pain, anal bleeding, blood in stool, constipation, diarrhea, nausea, rectal pain and vomiting.   Endocrine: Negative for cold intolerance, heat intolerance, polydipsia, polyphagia and polyuria.   Genitourinary:  Negative for decreased urine volume, difficulty urinating, dyspareunia, dysuria, enuresis, flank pain, frequency, genital sores, hematuria, menstrual problem, pelvic pain, urgency, vaginal bleeding, vaginal discharge and vaginal pain.   Musculoskeletal:   "Positive for back pain. Negative for arthralgias, gait problem, joint swelling, myalgias, neck pain and neck stiffness.   Skin:  Negative for color change, pallor, rash and wound.   Allergic/Immunologic: Negative for environmental allergies.   Neurological:  Negative for dizziness, tremors, seizures, syncope, facial asymmetry, speech difficulty, weakness, light-headedness, numbness and headaches.   Hematological:  Negative for adenopathy. Does not bruise/bleed easily.   Psychiatric/Behavioral:  Negative for agitation, behavioral problems, confusion, decreased concentration, dysphoric mood, hallucinations, self-injury, sleep disturbance and suicidal ideas. The patient is not nervous/anxious and is not hyperactive.        Objective   BP 98/65 (BP Location: Left arm, Patient Position: Sitting)   Pulse 95   Temp 36.3 °C (97.3 °F) (Tympanic)   Resp 16   Ht 1.607 m (5' 3.25\")   Wt 61.6 kg (135 lb 14.4 oz)   LMP  (LMP Unknown) Comment: hyst 2019  SpO2 95%   BMI 23.88 kg/m²      Physical Exam  Constitutional:       Appearance: Normal appearance.   HENT:      Head: Normocephalic and atraumatic.      Right Ear: Tympanic membrane, ear canal and external ear normal. There is no impacted cerumen.      Left Ear: Tympanic membrane, ear canal and external ear normal. There is no impacted cerumen.      Nose: Nose normal. No congestion or rhinorrhea.      Mouth/Throat:      Mouth: Mucous membranes are moist.      Pharynx: Oropharynx is clear. No oropharyngeal exudate or posterior oropharyngeal erythema.   Eyes:      Extraocular Movements: Extraocular movements intact.      Conjunctiva/sclera: Conjunctivae normal.      Pupils: Pupils are equal, round, and reactive to light.   Neck:      Vascular: No carotid bruit.   Cardiovascular:      Rate and Rhythm: Normal rate and regular rhythm.      Pulses: Normal pulses.      Heart sounds: Normal heart sounds. No murmur heard.  Pulmonary:      Effort: Pulmonary effort is normal. No " respiratory distress.      Breath sounds: Normal breath sounds. No wheezing, rhonchi or rales.   Abdominal:      General: Abdomen is flat. Bowel sounds are normal. There is no distension.      Palpations: Abdomen is soft.      Tenderness: There is no abdominal tenderness.      Hernia: No hernia is present.   Musculoskeletal:         General: No swelling or tenderness. Normal range of motion.      Cervical back: Normal range of motion and neck supple.      Right lower leg: No edema.      Left lower leg: No edema.   Lymphadenopathy:      Cervical: No cervical adenopathy.   Skin:     General: Skin is warm and dry.      Findings: No lesion or rash.   Neurological:      General: No focal deficit present.      Mental Status: She is alert and oriented to person, place, and time.      Cranial Nerves: No cranial nerve deficit.      Sensory: No sensory deficit.      Motor: No weakness.   Psychiatric:         Mood and Affect: Mood normal.         Behavior: Behavior normal.         Thought Content: Thought content normal.         Judgment: Judgment normal.         Assessment/Plan   Problem List Items Addressed This Visit       Herpes labialis    Uses valtrex prn  Refill provided         Relevant Medications    valACYclovir (Valtrex) 1 gram tablet    Lumbosacral dysfunction    Not having any major issues at present  Does yoga and has infra red pad to help           Other Visit Diagnoses         Routine general medical examination at a health care facility    -  Primary    Relevant Orders    Comprehensive Metabolic Panel    Follow Up In Primary Care - Health Maintenance      Screening for colorectal cancer        Relevant Orders    Colonoscopy Screening; Average Risk Patient      Leukopenia, unspecified type        Relevant Orders    CBC and Auto Differential      Screening for thyroid disorder        Relevant Orders    TSH with reflex to Free T4 if abnormal      Screening for lipid disorders        Relevant Orders    Lipid Panel

## 2025-05-06 NOTE — PATIENT INSTRUCTIONS
Get fasting blood work done when able  Consider two part shingrix vaccine if you had chicken pox  Continue to see GYN annually for exam/pap   Call and schedule colonoscopy-order is in  Continue meds as directed-refill on valtrex sent to pharmacy to use as needed   Continue healthy diet and exercise regularly-including yoga to help with back pain  Follow up in 1 year-sooner if needed

## 2025-05-21 ENCOUNTER — APPOINTMENT (OUTPATIENT)
Dept: OBSTETRICS AND GYNECOLOGY | Facility: CLINIC | Age: 50
End: 2025-05-21
Payer: COMMERCIAL

## 2025-05-21 VITALS
WEIGHT: 136 LBS | DIASTOLIC BLOOD PRESSURE: 80 MMHG | SYSTOLIC BLOOD PRESSURE: 120 MMHG | BODY MASS INDEX: 24.1 KG/M2 | HEIGHT: 63 IN

## 2025-05-21 DIAGNOSIS — Z01.419 ENCOUNTER FOR GYNECOLOGICAL EXAMINATION WITHOUT ABNORMAL FINDING: Primary | ICD-10-CM

## 2025-05-21 PROCEDURE — 3008F BODY MASS INDEX DOCD: CPT | Performed by: OBSTETRICS & GYNECOLOGY

## 2025-05-21 PROCEDURE — 99396 PREV VISIT EST AGE 40-64: CPT | Performed by: OBSTETRICS & GYNECOLOGY

## 2025-05-21 NOTE — PROGRESS NOTES
Subjective   Manjula Prabhakar is a 49 y.o. female here for a routine exam.  Personal health questionnaire reviewed: {yes/no:9010}.     Gynecologic History  No LMP recorded (lmp unknown). Patient has had a hysterectomy.  Contraception: {method:5051}  Last Pap: ***. Results were: {norm/abn:70473}  Last mammogram: ***. Results were: {norm/abn:09719}    Obstetric History  OB History    Para Term  AB Living   6 4   2    SAB IAB Ectopic Multiple Live Births   2          # Outcome Date GA Lbr Jg/2nd Weight Sex Type Anes PTL Lv   6 SAB            5 SAB            4 Para            3 Para            2 Para            1 Para                Objective   Physical Exam     Assessment/Plan   Healthy female exam.    {plan:09912}     Skin: Normal skin color and pigmentation, normal skin turgor, and no rash.   Neurologic: Non-focal. Grossly intact.   Psychiatric: Alert and oriented x 3. Affect normal to patient baseline. Mood: Appropriate.  Physical Exam     Assessment/Plan   Healthy female exam.  This is a 49-year-old female with a history of a hysterectomy.  No Pap smear was sent, she is high risk HPV negative in 2023.    Her routine mammogram was ordered with tomosynthesis, and is due in February 2026.    She has a colonoscopy planned through her PCP.   She is using estradiol cream, I recommended pea-sized amount 3 times weekly.  She declines needing a refill.  She does use intermittent [place].   I will see her routinely in 1 year.  Education reviewed: self breast exams.  Mammogram ordered.

## 2025-07-17 LAB
ALBUMIN SERPL-MCNC: 4.6 G/DL (ref 3.6–5.1)
ALP SERPL-CCNC: 68 U/L (ref 37–153)
ALT SERPL-CCNC: 29 U/L (ref 6–29)
ANION GAP SERPL CALCULATED.4IONS-SCNC: 8 MMOL/L (CALC) (ref 7–17)
AST SERPL-CCNC: 22 U/L (ref 10–35)
BASOPHILS # BLD AUTO: 20 CELLS/UL (ref 0–200)
BASOPHILS NFR BLD AUTO: 0.5 %
BILIRUB SERPL-MCNC: 0.9 MG/DL (ref 0.2–1.2)
BUN SERPL-MCNC: 17 MG/DL (ref 7–25)
CALCIUM SERPL-MCNC: 9.3 MG/DL (ref 8.6–10.4)
CHLORIDE SERPL-SCNC: 105 MMOL/L (ref 98–110)
CHOLEST SERPL-MCNC: 203 MG/DL
CHOLEST/HDLC SERPL: 2.2 (CALC)
CO2 SERPL-SCNC: 28 MMOL/L (ref 20–32)
CREAT SERPL-MCNC: 0.79 MG/DL (ref 0.5–1.03)
EGFRCR SERPLBLD CKD-EPI 2021: 91 ML/MIN/1.73M2
EOSINOPHIL # BLD AUTO: 183 CELLS/UL (ref 15–500)
EOSINOPHIL NFR BLD AUTO: 4.7 %
ERYTHROCYTE [DISTWIDTH] IN BLOOD BY AUTOMATED COUNT: 13.1 % (ref 11–15)
GLUCOSE SERPL-MCNC: 88 MG/DL (ref 65–99)
HCT VFR BLD AUTO: 44.8 % (ref 35–45)
HDLC SERPL-MCNC: 92 MG/DL
HGB BLD-MCNC: 14.4 G/DL (ref 11.7–15.5)
LDLC SERPL CALC-MCNC: 97 MG/DL (CALC)
LYMPHOCYTES # BLD AUTO: 1580 CELLS/UL (ref 850–3900)
LYMPHOCYTES NFR BLD AUTO: 40.5 %
MCH RBC QN AUTO: 30.5 PG (ref 27–33)
MCHC RBC AUTO-ENTMCNC: 32.1 G/DL (ref 32–36)
MCV RBC AUTO: 94.9 FL (ref 80–100)
MONOCYTES # BLD AUTO: 413 CELLS/UL (ref 200–950)
MONOCYTES NFR BLD AUTO: 10.6 %
NEUTROPHILS # BLD AUTO: 1704 CELLS/UL (ref 1500–7800)
NEUTROPHILS NFR BLD AUTO: 43.7 %
NONHDLC SERPL-MCNC: 111 MG/DL (CALC)
PLATELET # BLD AUTO: 253 THOUSAND/UL (ref 140–400)
PMV BLD REES-ECKER: 10 FL (ref 7.5–12.5)
POTASSIUM SERPL-SCNC: 4 MMOL/L (ref 3.5–5.3)
PROT SERPL-MCNC: 7.3 G/DL (ref 6.1–8.1)
RBC # BLD AUTO: 4.72 MILLION/UL (ref 3.8–5.1)
SODIUM SERPL-SCNC: 141 MMOL/L (ref 135–146)
TRIGL SERPL-MCNC: 49 MG/DL
TSH SERPL-ACNC: 2.17 MIU/L
WBC # BLD AUTO: 3.9 THOUSAND/UL (ref 3.8–10.8)

## 2026-05-07 ENCOUNTER — APPOINTMENT (OUTPATIENT)
Dept: PRIMARY CARE | Facility: CLINIC | Age: 51
End: 2026-05-07
Payer: COMMERCIAL

## 2026-06-24 ENCOUNTER — APPOINTMENT (OUTPATIENT)
Dept: OBSTETRICS AND GYNECOLOGY | Facility: CLINIC | Age: 51
End: 2026-06-24
Payer: COMMERCIAL